# Patient Record
Sex: MALE
[De-identification: names, ages, dates, MRNs, and addresses within clinical notes are randomized per-mention and may not be internally consistent; named-entity substitution may affect disease eponyms.]

---

## 2024-06-07 PROBLEM — Z00.00 ENCOUNTER FOR PREVENTIVE HEALTH EXAMINATION: Status: ACTIVE | Noted: 2024-06-07

## 2024-06-11 PROBLEM — S99.921A RIGHT FOOT INJURY: Status: ACTIVE | Noted: 2024-06-11

## 2024-06-13 ENCOUNTER — APPOINTMENT (OUTPATIENT)
Dept: ORTHOPEDIC SURGERY | Facility: CLINIC | Age: 34
End: 2024-06-13

## 2024-06-13 DIAGNOSIS — S99.921A UNSPECIFIED INJURY OF RIGHT FOOT, INITIAL ENCOUNTER: ICD-10-CM

## 2024-06-13 PROCEDURE — 99204 OFFICE O/P NEW MOD 45 MIN: CPT

## 2024-06-13 NOTE — DISCUSSION/SUMMARY
[de-identified] : 34-year-old gentleman with a right base of the fifth metatarsal fracture, nearly 2 weeks out.  -The risks and benefits of operative versus nonoperative management were discussed at length with the patient. The patient shows a good understanding of the injury and treatment options. They would like to move forward with nonoperative management.  -Nonweightbearing right foot LE in CAM boot. -Provided CAM Boot -Follow-up in 2 weeks with xrays of the right foot at that time. -All of the patient's questions and concerns were addressed.

## 2024-06-13 NOTE — HISTORY OF PRESENT ILLNESS
[de-identified] : Mr. ASTRID PEPPER is a 34 year old gentleman presenting for initial evaluation of a right foot injury sustained on 5/31/24 after he rolled his ankle when getting onto a boat. He was seen at [facility] on 6/5/24 where x-rays were performed. He was seen at [facility] and was given a short CAM boot and crutches. He has been weightbearing with the CAM boot and the bilateral crutches. He denies pain at the right foot.

## 2024-06-13 NOTE — PHYSICAL EXAM
[de-identified] : The patient is sitting comfortably in the exam room.  RIGHT foot -Skin is intact, no swelling, no ecchymosis -No pain with palpation over the medial malleolus -No pain with palpation over the dorsum of the foot, no plantar ecchymoses, no pain with movement of the first metatarsal relative to the second metatarsal -No subluxation of the peroneal tendons -Dorsiflexion: 5 , Plantarflexion: 45 -Sensation is intact L1-S1 -5/5 EHL, FHL, TA, GS -Foot is warm and well-perfused, palpable dorsalis pedis pulse  [de-identified] : X-rays of the right foot were taken at [facility] on 6/6/24. AP, Oblique and Lateral.

## 2024-06-27 ENCOUNTER — APPOINTMENT (OUTPATIENT)
Dept: ORTHOPEDIC SURGERY | Facility: CLINIC | Age: 34
End: 2024-06-27

## 2024-06-27 VITALS — HEIGHT: 72 IN | WEIGHT: 315 LBS | BODY MASS INDEX: 42.66 KG/M2

## 2024-06-27 DIAGNOSIS — S92.351A DISPLACED FRACTURE OF FIFTH METATARSAL BONE, RIGHT FOOT, INITIAL ENCOUNTER FOR CLOSED FRACTURE: ICD-10-CM

## 2024-06-27 PROCEDURE — 99203 OFFICE O/P NEW LOW 30 MIN: CPT

## 2024-06-27 PROCEDURE — 73630 X-RAY EXAM OF FOOT: CPT | Mod: RT

## 2024-07-25 ENCOUNTER — APPOINTMENT (OUTPATIENT)
Dept: ORTHOPEDIC SURGERY | Facility: CLINIC | Age: 34
End: 2024-07-25
Payer: COMMERCIAL

## 2024-07-25 DIAGNOSIS — S92.351A DISPLACED FRACTURE OF FIFTH METATARSAL BONE, RIGHT FOOT, INITIAL ENCOUNTER FOR CLOSED FRACTURE: ICD-10-CM

## 2024-07-25 PROCEDURE — 73630 X-RAY EXAM OF FOOT: CPT | Mod: RT

## 2024-07-25 PROCEDURE — 99213 OFFICE O/P EST LOW 20 MIN: CPT

## 2024-07-29 NOTE — PHYSICAL EXAM
[de-identified] : The patient is sitting comfortably in the exam room.  RIGHT foot -Skin is intact, no swelling, no ecchymosis -No pain with palpation over the medial malleolus -No pain with palpation over the dorsum of the foot, no plantar ecchymoses, no pain with movement of the first metatarsal relative to the second metatarsal -No subluxation of the peroneal tendons -Dorsiflexion: 5 , Plantarflexion: 45 -Sensation is intact L1-S1 -5/5 EHL, FHL, TA, GS -Foot is warm and well-perfused, palpable dorsalis pedis pulse  [de-identified] : X-rays of the right foot were taken in the office today, 7/25/24. AP, Oblique and Lateral.  X-rays show good overall alignment of the foot. There is some sclerosis at the fracture site and lack of complete bridging callus.

## 2024-07-29 NOTE — HISTORY OF PRESENT ILLNESS
[de-identified] : Mr. ASTRID PEPPER is a 34 year old gentleman presenting for follow up evaluation of a right base of the fifth metatarsal fracture sustained on 5/31/24, being treated nonoperatively. Since his last visit he states he is feeling much better.  He has been wearing a regular shoe and has been bearing full weight without any issues.  Works as a

## 2024-07-29 NOTE — HISTORY OF PRESENT ILLNESS
[de-identified] : Mr. ASTRID PEPPER is a 34 year old gentleman presenting for follow up evaluation of a right base of the fifth metatarsal fracture sustained on 5/31/24, being treated nonoperatively. Since his last visit he states he is feeling much better.  He has been wearing a regular shoe and has been bearing full weight without any issues.  Works as a

## 2024-07-29 NOTE — DISCUSSION/SUMMARY
[de-identified] : 34-year-old gentleman with a right base of the fifth metatarsal fracture, nearly 8 weeks out, treated nonoperatively.  -Long discussion was held with the patient regarding the current status of the fifth metatarsal when it comes to radiographic findings.  The patient continues to have little to no pain and has essentially resumed full activity.  I explained that if he continues to have no symptoms and the x-rays remain the same then there is likely nothing we need to do.  If he develops more pain or has persistent pain then we may need to further discuss treatment options which may include operative management. -X-ray and physical exam findings were discussed with the patient.  -Weightbearing as tolerated right LE in CAM boot. -Follow-up in 6 weeks with x-rays of the right foot at that time. -All of the patient's questions and concerns were addressed.

## 2024-07-29 NOTE — REASON FOR VISIT
[Follow-Up Visit] : a follow-up visit for [FreeTextEntry2] : right base of the fifth metatarsal fracture

## 2024-07-29 NOTE — HISTORY OF PRESENT ILLNESS
[de-identified] : Mr. ASTRID PEPPER is a 34 year old gentleman presenting for follow up evaluation of a right base of the fifth metatarsal fracture sustained on 5/31/24, being treated nonoperatively. Since his last visit he states he is feeling much better.  He has been wearing a regular shoe and has been bearing full weight without any issues.  Works as a  Azathioprine Pregnancy And Lactation Text: This medication is Pregnancy Category D and isn't considered safe during pregnancy. It is unknown if this medication is excreted in breast milk.

## 2024-07-29 NOTE — DISCUSSION/SUMMARY
[de-identified] : 34-year-old gentleman with a right base of the fifth metatarsal fracture, nearly 8 weeks out, treated nonoperatively.  -Long discussion was held with the patient regarding the current status of the fifth metatarsal when it comes to radiographic findings.  The patient continues to have little to no pain and has essentially resumed full activity.  I explained that if he continues to have no symptoms and the x-rays remain the same then there is likely nothing we need to do.  If he develops more pain or has persistent pain then we may need to further discuss treatment options which may include operative management. -X-ray and physical exam findings were discussed with the patient.  -Weightbearing as tolerated right LE in CAM boot. -Follow-up in 6 weeks with x-rays of the right foot at that time. -All of the patient's questions and concerns were addressed.

## 2024-07-29 NOTE — PHYSICAL EXAM
[de-identified] : The patient is sitting comfortably in the exam room.  RIGHT foot -Skin is intact, no swelling, no ecchymosis -No pain with palpation over the medial malleolus -No pain with palpation over the dorsum of the foot, no plantar ecchymoses, no pain with movement of the first metatarsal relative to the second metatarsal -No subluxation of the peroneal tendons -Dorsiflexion: 5 , Plantarflexion: 45 -Sensation is intact L1-S1 -5/5 EHL, FHL, TA, GS -Foot is warm and well-perfused, palpable dorsalis pedis pulse  [de-identified] : X-rays of the right foot were taken in the office today, 7/25/24. AP, Oblique and Lateral.  X-rays show good overall alignment of the foot. There is some sclerosis at the fracture site and lack of complete bridging callus.

## 2024-07-29 NOTE — DISCUSSION/SUMMARY
[de-identified] : 34-year-old gentleman with a right base of the fifth metatarsal fracture, nearly 8 weeks out, treated nonoperatively.  -Long discussion was held with the patient regarding the current status of the fifth metatarsal when it comes to radiographic findings.  The patient continues to have little to no pain and has essentially resumed full activity.  I explained that if he continues to have no symptoms and the x-rays remain the same then there is likely nothing we need to do.  If he develops more pain or has persistent pain then we may need to further discuss treatment options which may include operative management. -X-ray and physical exam findings were discussed with the patient.  -Weightbearing as tolerated right LE in CAM boot. -Follow-up in 6 weeks with x-rays of the right foot at that time. -All of the patient's questions and concerns were addressed.

## 2024-07-29 NOTE — PHYSICAL EXAM
[de-identified] : The patient is sitting comfortably in the exam room.  RIGHT foot -Skin is intact, no swelling, no ecchymosis -No pain with palpation over the medial malleolus -No pain with palpation over the dorsum of the foot, no plantar ecchymoses, no pain with movement of the first metatarsal relative to the second metatarsal -No subluxation of the peroneal tendons -Dorsiflexion: 5 , Plantarflexion: 45 -Sensation is intact L1-S1 -5/5 EHL, FHL, TA, GS -Foot is warm and well-perfused, palpable dorsalis pedis pulse  [de-identified] : X-rays of the right foot were taken in the office today, 7/25/24. AP, Oblique and Lateral.  X-rays show good overall alignment of the foot. There is some sclerosis at the fracture site and lack of complete bridging callus.